# Patient Record
Sex: MALE | Race: WHITE | Employment: OTHER | URBAN - NONMETROPOLITAN AREA
[De-identification: names, ages, dates, MRNs, and addresses within clinical notes are randomized per-mention and may not be internally consistent; named-entity substitution may affect disease eponyms.]

---

## 2017-03-13 ENCOUNTER — AMBULATORY - GICH (OUTPATIENT)
Dept: UROLOGY | Facility: OTHER | Age: 82
End: 2017-03-13

## 2017-03-13 DIAGNOSIS — Z85.46 PERSONAL HISTORY OF MALIGNANT NEOPLASM OF PROSTATE: ICD-10-CM

## 2017-05-04 ENCOUNTER — OFFICE VISIT - GICH (OUTPATIENT)
Dept: UROLOGY | Facility: OTHER | Age: 82
End: 2017-05-04

## 2017-05-04 ENCOUNTER — HISTORY (OUTPATIENT)
Dept: UROLOGY | Facility: OTHER | Age: 82
End: 2017-05-04

## 2017-05-04 ENCOUNTER — AMBULATORY - GICH (OUTPATIENT)
Dept: LAB | Facility: OTHER | Age: 82
End: 2017-05-04

## 2017-05-04 DIAGNOSIS — Z85.46 PERSONAL HISTORY OF MALIGNANT NEOPLASM OF PROSTATE: ICD-10-CM

## 2017-05-04 LAB — PSA TOTAL (DIAGNOSTIC) - HISTORICAL: 1.37 NG/ML

## 2017-10-11 ENCOUNTER — COMMUNICATION - GICH (OUTPATIENT)
Dept: UROLOGY | Facility: OTHER | Age: 82
End: 2017-10-11

## 2017-10-11 DIAGNOSIS — Z85.46 PERSONAL HISTORY OF MALIGNANT NEOPLASM OF PROSTATE: ICD-10-CM

## 2017-11-15 ENCOUNTER — HISTORY (OUTPATIENT)
Dept: UROLOGY | Facility: OTHER | Age: 82
End: 2017-11-15

## 2017-11-15 ENCOUNTER — OFFICE VISIT - GICH (OUTPATIENT)
Dept: UROLOGY | Facility: OTHER | Age: 82
End: 2017-11-15

## 2017-11-15 ENCOUNTER — AMBULATORY - GICH (OUTPATIENT)
Dept: LAB | Facility: OTHER | Age: 82
End: 2017-11-15

## 2017-11-15 DIAGNOSIS — Z85.46 PERSONAL HISTORY OF MALIGNANT NEOPLASM OF PROSTATE: ICD-10-CM

## 2017-11-15 LAB — PSA TOTAL (DIAGNOSTIC) - HISTORICAL: 0.89 NG/ML

## 2017-12-28 NOTE — TELEPHONE ENCOUNTER
Patient Information     Patient Name MRN Carroll Granados [id number] Male 1935      Telephone Encounter by Sofiya Garcia at 10/11/2017  3:40 PM     Author:  Sofiya Garcia Service:  (none) Author Type:  (none)     Filed:  10/11/2017  3:41 PM Encounter Date:  10/11/2017 Status:  Signed     :  Sofiya Garcia

## 2017-12-28 NOTE — PROGRESS NOTES
Patient Information     Patient Name MRCarroll Morrow [id number] Male 1935      Progress Notes by Ibrahima Hardy MD at 11/15/2017 11:30 AM     Author:  Ibrahima Hardy MD Service:  (none) Author Type:  Physician     Filed:  11/15/2017  1:15 PM Encounter Date:  11/15/2017 Status:  Signed     :  Ibrahima Hardy MD (Physician)            PCP:  Dr Dietrich    Type of Visit  Established    Chief Complaint  History of prostate cancer, s/p EBRT    HPI  Mr. Johnson is a 82 y.o. 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  He is following up today for a PSA 6 months after the last visit.  He has no new health complaints.  He denies unintentional weight loss, bone pain or new urinary symptoms.    He continues to have back pain however this is a chronic issue.  He has had back surgery twice in the past.  He is now undergoing steroid injections with benefit.      Review of Systems  I reviewed the ROS the patient today.    Nursing Notes:   Loan Martin  11/15/2017 11:30 AM  Unsigned  Here for 6 month follow up on PSA.  Review of Systems:    Weight loss:    No     Recent fever/chills:  No   Night sweats:   No  Current skin rash:  No   Recent hair loss:  No  Heat intolerance:  No   Cold intolerance:  Yes  Chest pain:   No   Palpitations:   No  Shortness of breath:  No   Wheezing:   No  Constipation:    No   Diarrhea:   No   Nausea:   No   Vomiting:   No   Kidney/side pain:  No   Back pain:   No  Frequent headaches:  No   Dizziness:     No  Leg swelling:   Yes   Calf pain:    No    Loan Martin LPN  11/15/2017  11:30 AM              Family History  Family History       Problem   Relation Age of Onset     Diabetes  Mother      Hypertension  Mother      Stroke  Mother      Asthma  Father      Cancer-prostate  Father      Cancer-prostate  Paternal Grandfather      Cancer  Father      Stomach         Physical Exam  /64  Resp 14  Wt 89.7 kg (197  lb 12.8 oz)  BMI 28.38 kg/m2   Constitutional: NAD, WDWN.  Cardiovascular: Regular rate.  Pulmonary/Chest: Respirations are even and non-labored bilaterally.  Abdominal: Soft. No distension, tenderness, masses or guarding. No CVA tenderness.  Extremities: MICHAEL x 4, Warm. No clubbing.  No cyanosis.    Skin: Pink, warm and dry.  No rashes noted.    Labs  Results for ALEX JOHNSON (MRN [id number]) as of 11/15/2017 11:33   5/23/2016 09:05 7/18/2016 09:10 11/17/2016 09:13 5/4/2017 09:35 11/15/2017 09:30   PSA TOTAL (DIAGNOSTIC) 5.273 (H) 3.676 (H) 3.273 (H) 1.374 0.891   **completed 78 Gy EBRT 3/2016    Pathology  I personally reviewed the pathology report  11/17/2015  26 cores were obtained and placed in 14 containers  Total cores positive  +4/14 containers  Highest Norman grade: 2/14 containers, 3+4, 50%  Additional cores  2/14 containers, 3+3, 70%  Negative cores  10/14 containers    Assessment  Mr. Johnson is a 82 y.o. 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  PSA lavelle has not been defined at this point.    I explained to him that post radiation treatment failure is defined in one of 2 ways:  A. Rise of PSA of lavelle +2 or  B. 3 consecutive increases in PSA    At this point his PSA continues to decrease which is excellent.    Plan  PSA in 6 months then once annually if stable

## 2017-12-29 NOTE — PATIENT INSTRUCTIONS
Patient Information     Patient Name MRN Alex Granados [id number] Male 1935      Patient Instructions by Ibrahima Hardy MD at 11/15/2017 11:30 AM     Author:  Ibrahima Hardy MD Service:  (none) Author Type:  Physician     Filed:  11/15/2017 11:41 AM Encounter Date:  11/15/2017 Status:  Signed     :  Ibrahima Hardy MD (Physician)            Results for ALEX FUENTES (MRN [id number]) as of 11/15/2017 11:33   2016 09:05 2016 09:10 2016 09:13 2017 09:35 11/15/2017 09:30   PSA TOTAL (DIAGNOSTIC) 5.273 (H) 3.676 (H) 3.273 (H) 1.374 0.891   **completed 78 Gy EBRT 3/2016

## 2017-12-30 NOTE — NURSING NOTE
Patient Information     Patient Name MRCarroll Morrow [id number] Male 1935      Nursing Note by Loan Martin at 11/15/2017 11:30 AM     Author:  Loan Martin Service:  (none) Author Type:  (none)     Filed:  11/15/2017 11:35 AM Encounter Date:  11/15/2017 Status:  Signed     :  Loan Martin            Here for 6 month follow up on PSA.  Review of Systems:    Weight loss:    No     Recent fever/chills:  No   Night sweats:   No  Current skin rash:  No   Recent hair loss:  No  Heat intolerance:  No   Cold intolerance:  Yes  Chest pain:   No   Palpitations:   No  Shortness of breath:  No   Wheezing:   No  Constipation:    No   Diarrhea:   No   Nausea:   No   Vomiting:   No   Kidney/side pain:  No   Back pain:   No  Frequent headaches:  No   Dizziness:     No  Leg swelling:   Yes   Calf pain:    No    Loan Martin LPN  11/15/2017  11:30 AM

## 2018-01-04 NOTE — PROGRESS NOTES
Patient Information     Patient Name MRCarroll Morrow [id number] Male 1935      Progress Notes by Ibrahima Hardy MD at 2017 11:30 AM     Author:  Ibrahima Hardy MD Service:  (none) Author Type:  Physician     Filed:  2017 12:08 PM Encounter Date:  2017 Status:  Signed     :  Ibrahima Hardy MD (Physician)            PCP:  Dr Dietrich    Type of Visit  Established    Chief Complaint  History of prostate cancer, s/p EBRT    HPI  Mr. Johnson is a 82 y.o. male with intermediate risk prostate cancer s/p EBRT completed 3/2016, 1 year ago.  He underwent treatment close to his winter home in [place].  He is following up today for a PSA.  No new complaints and he is doing well.  Symptoms are all stable he continues to have no unintentional weight loss, bone pain or new urinary symptoms.  He has had recent back trouble and has had 2 back surgeries.      Review of Systems  I reviewed the ROS the patient today.    Nursing Notes:   Loan Martin  2017 11:40 AM  Signed  Here for  5 month follow up on PSA.  Review of Systems:    Weight loss:    No     Recent fever/chills:  No   Night sweats:   No  Current skin rash:  No   Recent hair loss:  No  Heat intolerance:  No   Cold intolerance:  Yes  Chest pain:   No   Palpitations:   No  Shortness of breath:  No   Wheezing:   No  Constipation:    No   Diarrhea:   No   Nausea:   No   Vomiting:   No   Kidney/side pain:  No   Back pain:   Yes  Frequent headaches:  No   Dizziness:     No  Leg swelling:   Yes   Calf pain:    No      Loan Martin LPN  2017  11:26 AM            Family History  Family History       Problem   Relation Age of Onset     Diabetes  Mother      Hypertension  Mother      Stroke  Mother      Asthma  Father      Cancer-prostate  Father      Cancer-prostate  Paternal Grandfather      Cancer  Father      Stomach         Physical Exam  /70  Resp 14  Wt 91.2 kg (201 lb)  BMI 28.84 kg/m2   Constitutional:  NAD, WDWN.  Cardiovascular: Regular rate.  Pulmonary/Chest: Respirations are even and non-labored bilaterally.  Abdominal: Soft. No distension, tenderness, masses or guarding. No CVA tenderness.  Extremities: MICHAEL x 4, Warm. No clubbing.  No cyanosis.    Skin: Pink, warm and dry.  No rashes noted.    Labs  Results for ALEX JOHNSON (MRN [id number]) as of 5/4/2017 11:40   5/23/2016 09:05 7/18/2016 09:10 11/17/2016 09:13 5/4/2017 09:35   PSA TOTAL (DIAGNOSTIC) 5.273 (H) 3.676 (H) 3.273 (H) 1.374   PSA,FREE 0.32      **completed 78 Gy EBRT 3/2016    Pathology  I personally reviewed the pathology report  11/17/2015  26 cores were obtained and placed in 14 containers  Total cores positive  +4/14 containers  Highest [place] grade: 2/14 containers, 3+4, 50%  Additional cores  2/14 containers, 3+3, 70%  Negative cores  10/14 containers    Assessment  Mr. Johnson is a 82 y.o. male with intermediate risk prostate cancer s/p EBRT completed 3/2016, 1 year ago.  PSA lavelle has not been defined at this point.    I explained to him that post radiation treatment failure is defined in one of 2 ways:  A. Rise of PSA of lavelle +2 or  B. 3 consecutive increases in PSA    At this point his PSA continues to decrease which is excellent.    Plan  PSA in 6 months, November

## 2018-01-04 NOTE — PATIENT INSTRUCTIONS
Patient Information     Patient Name MRN Alex Granados [id number] Male 1935      Patient Instructions by Ibrahima Hardy MD at 2017 11:30 AM     Author:  Ibrahima Hardy MD Service:  (none) Author Type:  Physician     Filed:  2017 11:45 AM Encounter Date:  2017 Status:  Signed     :  Ibrahima Hardy MD (Physician)            Results for ALEX FUENTES (MRN [id number]) as of 2017 11:44   2016 09:05 2016 09:10 2016 09:13 2017 09:35   PSA TOTAL (DIAGNOSTIC) 5.273 (H) 3.676 (H) 3.273 (H) 1.374

## 2018-01-04 NOTE — NURSING NOTE
Patient Information     Patient Name MRCarroll Morrow [id number] Male 1935      Nursing Note by Loan Martin at 2017 11:30 AM     Author:  Loan Martin Service:  (none) Author Type:  (none)     Filed:  2017 11:40 AM Encounter Date:  2017 Status:  Signed     :  Loan Martin            Here for  5 month follow up on PSA.  Review of Systems:    Weight loss:    No     Recent fever/chills:  No   Night sweats:   No  Current skin rash:  No   Recent hair loss:  No  Heat intolerance:  No   Cold intolerance:  Yes  Chest pain:   No   Palpitations:   No  Shortness of breath:  No   Wheezing:   No  Constipation:    No   Diarrhea:   No   Nausea:   No   Vomiting:   No   Kidney/side pain:  No   Back pain:   Yes  Frequent headaches:  No   Dizziness:     No  Leg swelling:   Yes   Calf pain:    No      Loan Martin LPN  2017  11:26 AM

## 2018-01-27 VITALS
DIASTOLIC BLOOD PRESSURE: 64 MMHG | WEIGHT: 197.8 LBS | SYSTOLIC BLOOD PRESSURE: 122 MMHG | RESPIRATION RATE: 14 BRPM | BODY MASS INDEX: 28.38 KG/M2

## 2018-01-27 VITALS
RESPIRATION RATE: 14 BRPM | WEIGHT: 201 LBS | BODY MASS INDEX: 28.84 KG/M2 | DIASTOLIC BLOOD PRESSURE: 70 MMHG | SYSTOLIC BLOOD PRESSURE: 122 MMHG

## 2018-02-28 ENCOUNTER — DOCUMENTATION ONLY (OUTPATIENT)
Dept: FAMILY MEDICINE | Facility: OTHER | Age: 83
End: 2018-02-28

## 2018-02-28 RX ORDER — LEVOTHYROXINE SODIUM 100 UG/1
100 TABLET ORAL
COMMUNITY
Start: 2017-05-04

## 2018-02-28 RX ORDER — TERAZOSIN 1 MG/1
1 CAPSULE ORAL
COMMUNITY
Start: 2017-05-04 | End: 2019-05-15

## 2018-02-28 RX ORDER — IBUPROFEN 800 MG/1
800 TABLET, FILM COATED ORAL 2 TIMES DAILY PRN
COMMUNITY
Start: 2012-11-02

## 2018-02-28 RX ORDER — ASPIRIN 81 MG/1
81 TABLET, CHEWABLE ORAL
COMMUNITY
Start: 2017-05-04

## 2018-02-28 RX ORDER — MULTIVIT WITH MINERALS/LUTEIN
1 TABLET ORAL DAILY
COMMUNITY

## 2018-03-15 DIAGNOSIS — Z85.46 HISTORY OF PROSTATE CANCER: Primary | ICD-10-CM

## 2018-04-30 ENCOUNTER — OFFICE VISIT (OUTPATIENT)
Dept: UROLOGY | Facility: OTHER | Age: 83
End: 2018-04-30
Attending: UROLOGY
Payer: MEDICARE

## 2018-04-30 VITALS — WEIGHT: 202.2 LBS | RESPIRATION RATE: 16 BRPM | HEART RATE: 82 BPM | BODY MASS INDEX: 29.01 KG/M2

## 2018-04-30 DIAGNOSIS — Z85.46 HISTORY OF PROSTATE CANCER: Primary | ICD-10-CM

## 2018-04-30 DIAGNOSIS — Z85.46 HISTORY OF PROSTATE CANCER: ICD-10-CM

## 2018-04-30 LAB — PSA SERPL-MCNC: 0.64 NG/ML

## 2018-04-30 PROCEDURE — G0463 HOSPITAL OUTPT CLINIC VISIT: HCPCS

## 2018-04-30 PROCEDURE — 84153 ASSAY OF PSA TOTAL: CPT | Performed by: UROLOGY

## 2018-04-30 PROCEDURE — 99213 OFFICE O/P EST LOW 20 MIN: CPT | Performed by: UROLOGY

## 2018-04-30 PROCEDURE — 36415 COLL VENOUS BLD VENIPUNCTURE: CPT | Performed by: UROLOGY

## 2018-04-30 NOTE — MR AVS SNAPSHOT
"              After Visit Summary   2018    Carroll Johnson    MRN: [id number]           Patient Information     Date Of Birth          1935        Visit Information        Provider Department      2018 11:30 AM Ibrahima Hardy MD [facility]         Follow-ups after your visit        Who to contact     If you have questions or need follow up information about today's clinic visit or your schedule please contact [facility] directly at [phone].  Normal or non-critical lab and imaging results will be communicated to you by MyChart, letter or phone within 4 business days after the clinic has received the results. If you do not hear from us within 7 days, please contact the clinic through [employer] or phone. If you have a critical or abnormal lab result, we will notify you by phone as soon as possible.  Submit refill requests through [email or web address] or call your pharmacy and they will forward the refill request to us. Please allow 3 business days for your refill to be completed.          Additional Information About Your Visit        [employer] Information     [email or web address] lets you send messages to your doctor, view your test results, renew your prescriptions, schedule appointments and more. To sign up, go to [email or web address] . Click on \"Log in\" on the left side of the screen, which will take you to the Welcome page. Then click on \"Sign up Now\" on the right side of the page.     You will be asked to enter the access code listed below, as well as some personal information. Please follow the directions to create your username and password.     Your access code is: [email or web address]  Expires: 2018  1:18 PM     Your access code will  in 90 days. If you need help or a new code, please call your [facility] clinic or [phone].        Care EveryWhere ID     This is your Care EveryWhere ID. This could be used by other organizations to access your [facility] medical " records  [id number]        Your Vitals Were     Pulse Respirations BMI (Body Mass Index)             82 16 29.01 kg/m2          Blood Pressure from Last 3 Encounters:   11/15/17 122/64   05/04/17 122/70   11/17/16 126/76    Weight from Last 3 Encounters:   04/30/18 91.7 kg (202 lb 3.2 oz)   11/15/17 89.7 kg (197 lb 12.8 oz)   05/04/17 91.2 kg (201 lb)              Today, you had the following     No orders found for display       Primary Care Provider Office Phone # Fax #    Kip Dietrich [phone]       [facility] [address] [place] [address]        Equal Access to Services     [facility] Center[facility] : Hadii josé miguel deweyo Soomaali, waaxda luqadaha, qaybta kaalmada adeegyada, [facility] [place] . So [facility] [phone].    ATENCIÓN: Si habla español, tiene a nunez disposición servicios gratuitos de asistencia lingüística. [facility] [phone].    We comply with applicable federal civil rights laws and [place] laws. We do not discriminate on the basis of race, color, national origin, age, disability, sex, sexual orientation, or gender identity.            Thank you!     Thank you for choosing [facility]  for your care. Our goal is always to provide you with excellent care. Hearing back from our patients is one way we can continue to improve our services. Please take a few minutes to complete the written survey that you may receive in the mail after your visit with us. Thank you!             Your Updated Medication List - Protect others around you: Learn how to safely use, store and throw away your medicines at www.disposemymeds.org.          This list is accurate as of 4/30/18  1:18 PM.  Always use your most recent med list.                   Brand Name Dispense Instructions for use Diagnosis    amitriptyline 25 MG tablet    ELAVIL     Take 25 mg by mouth as needed        ascorbic acid 1000 MG Tabs    vitamin C     Take 1 tablet by mouth daily         aspirin 81 MG chewable tablet      Take 81 mg by mouth daily with food        ibuprofen 800 MG tablet    ADVIL/MOTRIN     Take 800 mg by mouth 2 times daily as needed With meals.        levothyroxine 100 MCG tablet    SYNTHROID/LEVOTHROID     Take 100 mcg by mouth every morning (before breakfast)         CALCIUM 500/VITAMIN D3 500-400 MG-UNIT Tabs per tablet   Generic drug:  calcium carbonate-vitamin D      Take 1 tablet by mouth daily         VITAMIN B-12 500 MCG Tabs   Generic drug:  cyanocobalamin      Take 1 tablet by mouth daily        terazosin 1 MG capsule    HYTRIN     Take 1 mg by mouth Take 1 capsule by mouth at bedtime. Take 2 capsules by mouth at bedtime

## 2018-04-30 NOTE — PROGRESS NOTES
PCP:  Dr Dietrich    Type of Visit  Established    Chief Complaint  History of prostate cancer, s/p EBRT    HPI  Mr. Johnson is a 83 y.o. male with intermediate risk prostate cancer s/p EBRT completed 3/2016, 2 years ago.  He is following up today for a PSA 6 months after the last visit.  He has no new health complaints.  He continues to deny unintentional weight loss no urinary symptoms or pelvic pain.  He has minimal bowel symptoms.  He takes 2 mg daily of terazosin for his urinary symptoms.    He continues to have back pain however this is a chronic issue.  He has had back surgery twice in the past.  He continues undergoing steroid injections with diminishing benefit.      Review of Systems  I reviewed the ROS the patient today.    Nursing Notes:   Sofiya Garcia LPN  4/30/2018 11:46 AM  Addendum  Pt presents to clinic for six month follow up, history of prostate cancer    Review of Systems:    Weight loss:    No     Recent fever/chills:  No   Night sweats:   No  Current skin rash:  No   Recent hair loss:  No  Heat intolerance:  No   Cold intolerance:  Yes  Chest pain:   No   Palpitations:   No  Shortness of breath:  No   Wheezing:   No  Constipation:    No   Diarrhea:   No   Nausea:   No   Vomiting:   No   Kidney/side pain:  No   Back pain:   Yes  Frequent headaches:  No   Dizziness:     No  Leg swelling:   Yes   Calf pain:    No      Physical Exam  Pulse 82  Resp 16  Wt 91.7 kg (202 lb 3.2 oz)  BMI 29.01 kg/m2  Constitutional: NAD, WDWN.  Cardiovascular: Regular rate.  Pulmonary/Chest: Respirations are even and non-labored bilaterally.  Abdominal: Soft. No distension, tenderness, masses or guarding. No CVA tenderness.  Extremities: MICHAEL x 4, Warm. No clubbing.  No cyanosis.    Skin: Pink, warm and dry.  No rashes noted.    Labs  Results for ALEX JOHNSON (MRN [id number]) as of 4/30/2018 11:56   4/30/2018 09:40   Prostate Specific Antigen 0.636     Results for ALEX JOHNSON (MRN  [id number]) as of 11/15/2017 11:33   5/23/2016 09:05 7/18/2016 09:10 11/17/2016 09:13 5/4/2017 09:35 11/15/2017 09:30   PSA TOTAL (DIAGNOSTIC) 5.273 (H) 3.676 (H) 3.273 (H) 1.374 0.891   **completed 78 Gy EBRT 3/2016    Pathology  I personally reviewed the pathology report  11/17/2015  26 cores were obtained and placed in 14 containers  Total cores positive  +4/14 containers  Highest [place] grade: 2/14 containers, 3+4, 50%  Additional cores  2/14 containers, 3+3, 70%  Negative cores  10/14 containers    Assessment  Mr. Johnson is a 83 y.o. male with intermediate risk prostate cancer s/p EBRT completed 3/2016, 2 years ago.  PSA continues downward trend  Discussed decreasing interval of PSAs to once annually.    Plan  Terazosin 2mg daily  PSA once annually

## 2018-04-30 NOTE — NURSING NOTE
Pt presents to clinic for six month follow up, history of prostate cancer    Review of Systems:    Weight loss:    No     Recent fever/chills:  No   Night sweats:   No  Current skin rash:  No   Recent hair loss:  No  Heat intolerance:  No   Cold intolerance:  Yes  Chest pain:   No   Palpitations:   No  Shortness of breath:  No   Wheezing:   No  Constipation:    No   Diarrhea:   No   Nausea:   No   Vomiting:   No   Kidney/side pain:  No   Back pain:   Yes  Frequent headaches:  No   Dizziness:     No  Leg swelling:   Yes   Calf pain:    No

## 2018-07-23 NOTE — PROGRESS NOTES
Patient Information     Patient Name  Carroll Johnson MRN  [id number] Sex  Male   1935      Letter by Ibrahima Hardy MD at      Author:  Ibrahima Hardy MD Service:  (none) Author Type:  (none)    Filed:   Encounter Date:  11/15/2017 Status:  (Other)           Kip Dietrich MD  [address]   [address]  [place] [address]          November 15, 2017    Re:  Carroll Johnson       : 1935  [address] [address] [facility]  [place] [address]    Appointment Date: 11/15/17      Dear  Dr Dietrich,    Thank you for allowing me to take part in this patient s care.  I copied my note below from today's clinic visit for your records.  Please feel free to contact me with any questions      Warm regards,            Ibrahima Hardy MD, PharmD, FACS  Urologist  [address] [place] [address]  Appointments: [phone]                    PCP:  Dr Dietrich    Type of Visit  Established    Chief Complaint  History of prostate cancer, s/p EBRT    HPI  Mr. Johnson is a 82 y.o. 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  He is following up today for a PSA 6 months after the last visit.  He has no new health complaints.  He denies unintentional weight loss, bone pain or new urinary symptoms.    He continues to have back pain however this is a chronic issue.  He has had back surgery twice in the past.  He is now undergoing steroid injections with benefit.      Review of Systems  I reviewed the ROS the patient today.    Nursing Notes:   Loan Martin  11/15/2017 11:30 AM  Unsigned  Here for 6 month follow up on PSA.  Review of Systems:    Weight loss:    No     Recent fever/chills:  No   Night sweats:   No  Current skin rash:  No   Recent hair loss:  No  Heat intolerance:  No   Cold intolerance:  Yes  Chest pain:   No   Palpitations:   No  Shortness of breath:  No   Wheezing:   No  Constipation:     No   Diarrhea:   No   Nausea:   No   Vomiting:   No   Kidney/side pain:  No   Back pain:   No  Frequent headaches:  No   Dizziness:     No  Leg swelling:   Yes   Calf pain:    No    Loan Martin LPN  11/15/2017  11:30 AM              Family History  Family History       Problem   Relation Age of Onset     Diabetes  Mother      Hypertension  Mother      Stroke  Mother      Asthma  Father      Cancer-prostate  Father      Cancer-prostate  Paternal Grandfather      Cancer  Father      Stomach         Physical Exam  /64  Resp 14  Wt 89.7 kg (197 lb 12.8 oz)  BMI 28.38 kg/m2   Constitutional: NAD, WDWN.  Cardiovascular: Regular rate.  Pulmonary/Chest: Respirations are even and non-labored bilaterally.  Abdominal: Soft. No distension, tenderness, masses or guarding. No CVA tenderness.  Extremities: MICHAEL x 4, Warm. No clubbing.  No cyanosis.    Skin: Pink, warm and dry.  No rashes noted.    Labs  Results for ALEX FUENTES (MRN [id number]) as of 11/15/2017 11:33   5/23/2016 09:05 7/18/2016 09:10 11/17/2016 09:13 5/4/2017 09:35 11/15/2017 09:30   PSA TOTAL (DIAGNOSTIC) 5.273 (H) 3.676 (H) 3.273 (H) 1.374 0.891   **completed 78 Gy EBRT 3/2016    Pathology  I personally reviewed the pathology report  11/17/2015  26 cores were obtained and placed in 14 containers  Total cores positive  +4/14 containers  Highest [place] grade: 2/14 containers, 3+4, 50%  Additional cores  2/14 containers, 3+3, 70%  Negative cores  10/14 containers    Assessment  Mr. Fuentes is a 82 y.o. 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  PSA lavelle has not been defined at this point.    I explained to him that post radiation treatment failure is defined in one of 2 ways:  A. Rise of PSA of lavelle +2 or  B. 3 consecutive increases in PSA    At this point his PSA continues to decrease which is excellent.    Plan  PSA in 6 months then once annually if stable

## 2018-08-23 ENCOUNTER — HOSPITAL ENCOUNTER (OUTPATIENT)
Dept: GENERAL RADIOLOGY | Facility: OTHER | Age: 83
Discharge: HOME | End: 2018-08-23
Attending: ORTHOPAEDIC SURGERY | Admitting: ORTHOPAEDIC SURGERY
Payer: MEDICARE

## 2018-08-23 DIAGNOSIS — M48.062 SPINAL STENOSIS, LUMBAR REGION, WITH NEUROGENIC CLAUDICATION: ICD-10-CM

## 2018-08-23 DIAGNOSIS — M54.50 LOW BACK PAIN: ICD-10-CM

## 2018-08-23 PROCEDURE — 25000128 H RX IP 250 OP 636: Performed by: RADIOLOGY

## 2018-08-23 PROCEDURE — 25000125 ZZHC RX 250: Performed by: RADIOLOGY

## 2018-08-23 PROCEDURE — 62323 NJX INTERLAMINAR LMBR/SAC: CPT

## 2018-08-23 PROCEDURE — 25500064 ZZH RX 255 OP 636: Performed by: RADIOLOGY

## 2018-08-23 RX ORDER — BETAMETHASONE SODIUM PHOSPHATE AND BETAMETHASONE ACETATE 3; 3 MG/ML; MG/ML
12 INJECTION, SUSPENSION INTRA-ARTICULAR; INTRALESIONAL; INTRAMUSCULAR; SOFT TISSUE ONCE
Status: COMPLETED | OUTPATIENT
Start: 2018-08-23 | End: 2018-08-23

## 2018-08-23 RX ORDER — LIDOCAINE HYDROCHLORIDE 10 MG/ML
2 INJECTION, SOLUTION EPIDURAL; INFILTRATION; INTRACAUDAL; PERINEURAL ONCE
Status: COMPLETED | OUTPATIENT
Start: 2018-08-23 | End: 2018-08-23

## 2018-08-23 RX ADMIN — LIDOCAINE HYDROCHLORIDE 2 ML: 10 INJECTION, SOLUTION EPIDURAL; INFILTRATION; INTRACAUDAL; PERINEURAL at 09:47

## 2018-08-23 RX ADMIN — BETAMETHASONE SODIUM PHOSPHATE AND BETAMETHASONE ACETATE 12 MG: 3; 3 INJECTION, SUSPENSION INTRA-ARTICULAR; INTRALESIONAL; INTRAMUSCULAR at 09:46

## 2018-08-23 RX ADMIN — IOHEXOL 4 ML: 240 INJECTION, SOLUTION INTRATHECAL; INTRAVASCULAR; INTRAVENOUS; ORAL at 09:47

## 2018-08-23 RX ADMIN — LIDOCAINE HYDROCHLORIDE 4 ML: 10 INJECTION, SOLUTION INFILTRATION; PERINEURAL at 09:47

## 2018-10-23 ENCOUNTER — HOSPITAL ENCOUNTER (OUTPATIENT)
Dept: GENERAL RADIOLOGY | Facility: OTHER | Age: 83
Discharge: HOME | End: 2018-10-23
Attending: ORTHOPAEDIC SURGERY | Admitting: FAMILY MEDICINE
Payer: MEDICARE

## 2018-10-23 DIAGNOSIS — M48.062 SPINAL STENOSIS, LUMBAR REGION, WITH NEUROGENIC CLAUDICATION: ICD-10-CM

## 2018-10-23 DIAGNOSIS — M54.9 BACK PAIN: ICD-10-CM

## 2018-10-23 DIAGNOSIS — M79.606 LEG PAIN: ICD-10-CM

## 2018-10-23 PROCEDURE — 25000128 H RX IP 250 OP 636: Mod: GZ | Performed by: RADIOLOGY

## 2018-10-23 PROCEDURE — 25000125 ZZHC RX 250: Performed by: RADIOLOGY

## 2018-10-23 PROCEDURE — 27211110 XR SACROILIAC THERAPEUTIC INJECTION BILATERAL

## 2018-10-23 PROCEDURE — 25500064 ZZH RX 255 OP 636: Performed by: RADIOLOGY

## 2018-10-23 RX ORDER — BUPIVACAINE HYDROCHLORIDE 5 MG/ML
2 INJECTION, SOLUTION PERINEURAL ONCE
Status: COMPLETED | OUTPATIENT
Start: 2018-10-23 | End: 2018-10-23

## 2018-10-23 RX ORDER — TRIAMCINOLONE ACETONIDE 40 MG/ML
80 INJECTION, SUSPENSION INTRA-ARTICULAR; INTRAMUSCULAR ONCE
Status: COMPLETED | OUTPATIENT
Start: 2018-10-23 | End: 2018-10-23

## 2018-10-23 RX ADMIN — TRIAMCINOLONE ACETONIDE 80 MG: 40 INJECTION, SUSPENSION INTRA-ARTICULAR; INTRAMUSCULAR at 12:50

## 2018-10-23 RX ADMIN — IOHEXOL 2 ML: 240 INJECTION, SOLUTION INTRATHECAL; INTRAVASCULAR; INTRAVENOUS; ORAL at 12:50

## 2018-10-23 RX ADMIN — BUPIVACAINE HYDROCHLORIDE 20 MG: 5 INJECTION, SOLUTION PERINEURAL at 12:50

## 2018-10-23 RX ADMIN — LIDOCAINE HYDROCHLORIDE 2 ML: 10 INJECTION, SOLUTION INFILTRATION; PERINEURAL at 12:50

## 2019-01-08 ENCOUNTER — DOCUMENTATION ONLY (OUTPATIENT)
Dept: OTHER | Facility: CLINIC | Age: 84
End: 2019-01-08

## 2019-01-24 DIAGNOSIS — Z85.46 HISTORY OF PROSTATE CANCER: Primary | ICD-10-CM

## 2019-05-15 ENCOUNTER — OFFICE VISIT (OUTPATIENT)
Dept: UROLOGY | Facility: OTHER | Age: 84
End: 2019-05-15
Attending: UROLOGY
Payer: MEDICARE

## 2019-05-15 VITALS — BODY MASS INDEX: 28.96 KG/M2 | WEIGHT: 201.8 LBS | RESPIRATION RATE: 16 BRPM | HEART RATE: 76 BPM

## 2019-05-15 DIAGNOSIS — Z85.46 HISTORY OF PROSTATE CANCER: ICD-10-CM

## 2019-05-15 DIAGNOSIS — Z85.46 HISTORY OF PROSTATE CANCER: Primary | ICD-10-CM

## 2019-05-15 LAB — PSA SERPL-MCNC: 0.34 NG/ML

## 2019-05-15 PROCEDURE — 99213 OFFICE O/P EST LOW 20 MIN: CPT | Performed by: UROLOGY

## 2019-05-15 PROCEDURE — 84153 ASSAY OF PSA TOTAL: CPT | Performed by: UROLOGY

## 2019-05-15 PROCEDURE — 36415 COLL VENOUS BLD VENIPUNCTURE: CPT | Performed by: UROLOGY

## 2019-05-15 PROCEDURE — G0463 HOSPITAL OUTPT CLINIC VISIT: HCPCS

## 2019-05-15 RX ORDER — TERAZOSIN 1 MG/1
2 CAPSULE ORAL AT BEDTIME
COMMUNITY
Start: 2019-05-15 | End: 2020-06-01

## 2019-05-15 ASSESSMENT — PAIN SCALES - GENERAL: PAINLEVEL: NO PAIN (0)

## 2019-05-15 NOTE — NURSING NOTE
Review of Systems:    Weight loss:    No     Recent fever/chills:  No   Night sweats:   No  Current skin rash:  No   Recent hair loss:  No  Heat intolerance:  No   Cold intolerance:  No  Chest pain:   No   Palpitations:   No  Shortness of breath:  No   Wheezing:   No  Constipation:    No   Diarrhea:   No   Nausea:   No   Vomiting:   No   Kidney/side pain:  No   Back pain:   Yes  Frequent headaches:  No   Dizziness:     No  Leg swelling:   Yes   Calf pain:    No

## 2019-05-15 NOTE — PROGRESS NOTES
PCP:  Dr Dietrich    Type of Visit  Established    Chief Complaint  History of prostate cancer, s/p EBRT    HPI  Mr. Johnson is a 84 y.o. 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  He was last seen 1 year ago and follows up with annual PSA checks.  Last year he denies any significant healthcare changes.  He denies irritative urinary and bowel symptoms.  He continues taking Solitario and 2 mg daily without issue.    He continues to have back pain however this is a chronic issue.  He has had back surgery twice in the past.  He continues undergoing steroid injections with diminishing benefit.  He recently saw a spine specialist who was told that his back issues are essentially nonoperable.      Review of Systems  I reviewed the ROS the patient today.    Nursing Notes:   Rosina Gandhi RN  5/15/2019  2:02 PM  Signed  Review of Systems:    Weight loss:    No     Recent fever/chills:  No   Night sweats:   No  Current skin rash:  No   Recent hair loss:  No  Heat intolerance:  No   Cold intolerance:  No  Chest pain:   No   Palpitations:   No  Shortness of breath:  No   Wheezing:   No  Constipation:    No   Diarrhea:   No   Nausea:   No   Vomiting:   No   Kidney/side pain:  No   Back pain:   Yes  Frequent headaches:  No   Dizziness:     No  Leg swelling:   Yes   Calf pain:    No     Physical Exam  Pulse 76   Resp 16   Wt 91.5 kg (201 lb 12.8 oz)   BMI 28.96 kg/m    Constitutional: NAD, WDWN.  Cardiovascular: Regular rate.  Pulmonary/Chest: Respirations are even and non-labored bilaterally.  Abdominal: Soft. No distension, tenderness, masses or guarding. No CVA tenderness.  Extremities: MICHAEL x 4, Warm. No clubbing.  No cyanosis.    Skin: Pink, warm and dry.  No rashes noted.    LabsResults for ALEX JOHNSON (MRN [id number]) as of 5/15/2019 13:39   11/15/2017 10:17 4/30/2018 09:40 5/15/2019 09:59   Prostate Specific Antigen  0.636 0.340   PSA Total 0.891       Results  for ALEX JOHNSON (MRN [id number]) as of 11/15/2017 11:33   5/23/2016 09:05 7/18/2016 09:10 11/17/2016 09:13 5/4/2017 09:35   PSA TOTAL (DIAGNOSTIC) 5.273 (H) 3.676 (H) 3.273 (H) 1.374   **completed 78 Gy EBRT 3/2016    Pathology  I personally reviewed the pathology report  11/17/2015  26 cores were obtained and placed in 14 containers  Total cores positive  +4/14 containers  Highest [place] grade: 2/14 containers, 3+4, 50%  Additional cores  2/14 containers, 3+3, 70%  Negative cores  10/14 containers    Assessment  Mr. Johnson is a 84 y.o. male with intermediate risk prostate cancer s/p EBRT completed 3/2016.  His PSA continues a downward trend.    Plan  Terazosin 2mg daily  Follow-up for PSA once annually

## 2020-03-24 DIAGNOSIS — Z85.46 HISTORY OF PROSTATE CANCER: Primary | ICD-10-CM

## 2020-06-01 ENCOUNTER — OFFICE VISIT (OUTPATIENT)
Dept: UROLOGY | Facility: OTHER | Age: 85
End: 2020-06-01
Attending: UROLOGY
Payer: MEDICARE

## 2020-06-01 VITALS
RESPIRATION RATE: 16 BRPM | WEIGHT: 181 LBS | SYSTOLIC BLOOD PRESSURE: 112 MMHG | DIASTOLIC BLOOD PRESSURE: 76 MMHG | HEART RATE: 78 BPM | BODY MASS INDEX: 25.97 KG/M2

## 2020-06-01 DIAGNOSIS — Z85.46 HISTORY OF PROSTATE CANCER: ICD-10-CM

## 2020-06-01 DIAGNOSIS — R35.0 BENIGN PROSTATIC HYPERPLASIA WITH URINARY FREQUENCY: Primary | ICD-10-CM

## 2020-06-01 DIAGNOSIS — N40.1 BENIGN PROSTATIC HYPERPLASIA WITH URINARY FREQUENCY: Primary | ICD-10-CM

## 2020-06-01 LAB — PSA SERPL-MCNC: 0.26 NG/ML

## 2020-06-01 PROCEDURE — 84153 ASSAY OF PSA TOTAL: CPT | Mod: ZL | Performed by: UROLOGY

## 2020-06-01 PROCEDURE — 99213 OFFICE O/P EST LOW 20 MIN: CPT | Performed by: UROLOGY

## 2020-06-01 PROCEDURE — G0463 HOSPITAL OUTPT CLINIC VISIT: HCPCS

## 2020-06-01 PROCEDURE — 36415 COLL VENOUS BLD VENIPUNCTURE: CPT | Mod: ZL | Performed by: UROLOGY

## 2020-06-01 RX ORDER — TAMSULOSIN HYDROCHLORIDE 0.4 MG/1
0.4 CAPSULE ORAL EVERY EVENING
Qty: 90 CAPSULE | Refills: 3 | Status: SHIPPED | OUTPATIENT
Start: 2020-06-01 | End: 2021-07-21

## 2020-06-01 RX ORDER — PAROXETINE 10 MG/1
20 TABLET, FILM COATED ORAL EVERY MORNING
COMMUNITY

## 2020-06-01 RX ORDER — DOXAZOSIN 1 MG/1
1 TABLET ORAL AT BEDTIME
COMMUNITY
End: 2020-06-01

## 2020-06-01 ASSESSMENT — PAIN SCALES - GENERAL: PAINLEVEL: MILD PAIN (2)

## 2020-06-01 NOTE — NURSING NOTE
Pt presents to clinic for a one year follow up    Review of Systems:    Weight loss:    Yes     Recent fever/chills:  No   Night sweats:   No  Current skin rash:  No   Recent hair loss:  No  Heat intolerance:  No   Cold intolerance:  Yes  Chest pain:   No   Palpitations:   No  Shortness of breath:  No   Wheezing:   No  Constipation:    No   Diarrhea:   No   Nausea:   No   Vomiting:   No   Kidney/side pain:  No   Back pain:   Yes  Frequent headaches:  No   Dizziness:     No  Leg swelling:   Yes   Calf pain:    No

## 2020-06-01 NOTE — PROGRESS NOTES
PCP:  Dr Dietrich    Type of Visit  Established    Chief Complaint  History of prostate cancer, s/p EBRT  BPH with weak stream    HPI  Mr. Johnson is a 85 y.o. male with intermediate risk prostate cancer s/p EBRT completed in 2016.  The patient follows up once a year with PSA prior.  He also successfully takes terazosin 2mg for urinary issues.  He denies any health changes in the last year.  He denies unintentional weight loss, bone or pelvic pain.    He continues to have back pain however this is a chronic issue.  He has had back surgery twice in the past.  He continues undergoing steroid injections with diminishing benefit.  He previously saw a spine specialist who was told that his back issues are essentially nonoperable.    The patient had been on long-term alpha blockade for BPH benefit but has recently developed some lightheadedness.  He has been on both terazosin and doxazosin.  He is asking about alternative to my side effects of lightheadedness.      Review of Systems  I reviewed the ROS the patient today.    Nursing Notes:   Sofiya Garcia LPN  6/1/2020  2:07 PM  Signed  Pt presents to clinic for a one year follow up    Review of Systems:    Weight loss:    Yes     Recent fever/chills:  No   Night sweats:   No  Current skin rash:  No   Recent hair loss:  No  Heat intolerance:  No   Cold intolerance:  Yes  Chest pain:   No   Palpitations:   No  Shortness of breath:  No   Wheezing:   No  Constipation:    No   Diarrhea:   No   Nausea:   No   Vomiting:   No   Kidney/side pain:  No   Back pain:   Yes  Frequent headaches:  No   Dizziness:     No  Leg swelling:   Yes   Calf pain:    No      Physical Exam  /76 (BP Location: Left arm, Patient Position: Sitting, Cuff Size: Adult Regular)   Pulse 78   Resp 16   Wt 82.1 kg (181 lb)   BMI 25.97 kg/m    Constitutional: NAD, WDWN.  Cardiovascular: Regular rate.  Pulmonary/Chest: Respirations are even and non-labored bilaterally.  Abdominal: Soft. No  distension, tenderness, masses or guarding. No CVA tenderness.  Extremities: MICHAEL x 4, Warm. No clubbing.  No cyanosis.    Skin: Pink, warm and dry.  No rashes noted.    Labs  Results for ALEX JOHNSON (MRN [id number]) as of 6/1/2020 13:55   6/1/2020 10:49   Prostate Specific Antigen 0.256     Results for ALEX JOHNSON (MRN [id number]) as of 5/15/2019 13:39   11/15/2017 10:17 4/30/2018 09:40 5/15/2019 09:59   Prostate Specific Antigen 0.891 0.636 0.340     Results for ALEX JOHNSON (MRN [id number]) as of 11/15/2017 11:33   5/23/2016 09:05 7/18/2016 09:10 11/17/2016 09:13 5/4/2017 09:35   PSA TOTAL (DIAGNOSTIC) 5.273 (H) 3.676 (H) 3.273 (H) 1.374   **completed 78 Gy EBRT 3/2016    Pathology  I personally reviewed the pathology report  11/17/2015  26 cores were obtained and placed in 14 containers  Total cores positive  +4/14 containers  Highest [place] grade: 2/14 containers, 3+4, 50%  Additional cores  2/14 containers, 3+3, 70%  Negative cores  10/14 containers    Assessment  Mr. Johnson is a 85 y.o. male with intermediate risk prostate cancer s/p EBRT completed in 2016.  His PSA is completely stable.    We discussed side effects of Flomax including, but not limited to, retrograde ejaculation, congestion and lightheadedness.    Plan  Discontinue doxazosin  I recommended the patient start Flomax 0.4 mg once daily  Follow-up for PSA once annually

## 2021-02-03 DIAGNOSIS — Z85.46 HISTORY OF PROSTATE CANCER: Primary | ICD-10-CM

## 2021-02-03 NOTE — PROGRESS NOTES
"Per last office visit  6/1/20 with Ibrahima Hardy MD \"Plan  Discontinue doxazosin  I recommended the patient start Flomax 0.4 mg once daily  Follow-up for PSA once annually\"        Orders Placed    "

## 2021-05-26 ENCOUNTER — RECORDS - HEALTHEAST (OUTPATIENT)
Dept: ADMINISTRATIVE | Facility: CLINIC | Age: 86
End: 2021-05-26

## 2021-06-22 DIAGNOSIS — N40.1 BENIGN PROSTATIC HYPERPLASIA WITH URINARY FREQUENCY: ICD-10-CM

## 2021-06-22 DIAGNOSIS — R35.0 BENIGN PROSTATIC HYPERPLASIA WITH URINARY FREQUENCY: ICD-10-CM

## 2021-07-01 RX ORDER — TAMSULOSIN HYDROCHLORIDE 0.4 MG/1
CAPSULE ORAL
Qty: 90 CAPSULE | Refills: 0 | OUTPATIENT
Start: 2021-07-01

## 2021-07-21 ENCOUNTER — OFFICE VISIT (OUTPATIENT)
Dept: UROLOGY | Facility: OTHER | Age: 86
End: 2021-07-21
Attending: UROLOGY
Payer: MEDICARE

## 2021-07-21 ENCOUNTER — LAB (OUTPATIENT)
Dept: LAB | Facility: OTHER | Age: 86
End: 2021-07-21
Attending: UROLOGY
Payer: MEDICARE

## 2021-07-21 VITALS
SYSTOLIC BLOOD PRESSURE: 119 MMHG | WEIGHT: 186 LBS | RESPIRATION RATE: 16 BRPM | DIASTOLIC BLOOD PRESSURE: 72 MMHG | OXYGEN SATURATION: 96 % | BODY MASS INDEX: 26.69 KG/M2 | HEART RATE: 94 BPM

## 2021-07-21 DIAGNOSIS — R35.0 BENIGN PROSTATIC HYPERPLASIA WITH URINARY FREQUENCY: ICD-10-CM

## 2021-07-21 DIAGNOSIS — Z85.46 HISTORY OF PROSTATE CANCER: ICD-10-CM

## 2021-07-21 DIAGNOSIS — N40.1 BENIGN PROSTATIC HYPERPLASIA WITH URINARY FREQUENCY: ICD-10-CM

## 2021-07-21 DIAGNOSIS — Z85.46 HISTORY OF PROSTATE CANCER: Primary | ICD-10-CM

## 2021-07-21 LAB — PSA SERPL-MCNC: 0.2 UG/L (ref 0–4)

## 2021-07-21 PROCEDURE — 36415 COLL VENOUS BLD VENIPUNCTURE: CPT | Mod: ZL

## 2021-07-21 PROCEDURE — G0463 HOSPITAL OUTPT CLINIC VISIT: HCPCS

## 2021-07-21 PROCEDURE — 99213 OFFICE O/P EST LOW 20 MIN: CPT | Performed by: UROLOGY

## 2021-07-21 PROCEDURE — 84153 ASSAY OF PSA TOTAL: CPT | Mod: ZL

## 2021-07-21 RX ORDER — TAMSULOSIN HYDROCHLORIDE 0.4 MG/1
0.4 CAPSULE ORAL EVERY EVENING
Qty: 90 CAPSULE | Refills: 3 | Status: SHIPPED | OUTPATIENT
Start: 2021-07-21

## 2021-07-21 ASSESSMENT — PAIN SCALES - GENERAL: PAINLEVEL: NO PAIN (1)

## 2021-07-21 NOTE — NURSING NOTE
Chief Complaint   Patient presents with     Follow Up     Medication refill     Patient presents to the clinic today for a follow up for medication refill.    Review of Systems:    Weight loss:    No     Recent fever/chills:  No   Night sweats:   No  Current skin rash:  No   Recent hair loss:  No  Heat intolerance:  No   Cold intolerance:  No  Chest pain:   No   Palpitations:   No  Shortness of breath:  No   Wheezing:   No  Constipation:    No   Diarrhea:   No   Nausea:   No   Vomiting:   No   Kidney/side pain:  No   Back pain:   Yes  Frequent headaches:  No   Dizziness:     No  Leg swelling:   Yes   Calf pain:    No         Medication Reconciliation: completed   Taylor Navas LPN  7/21/2021 1:31 PM

## 2021-07-21 NOTE — PROGRESS NOTES
PCP:  Dr Dietrich    Type of Visit  Established    Chief Complaint  History of prostate cancer, s/p EBRT  BPH with weak stream    HPI  Mr. Johnson is a 86 y.o. male with intermediate risk prostate cancer s/p EBRT completed in 2016.    Patient follows up for medication refill.  He also underwent a PSA earlier.  He continues to struggle with chronic back pain.  No new symptoms however such as gross hematuria, dysuria or pelvic pain.    The patient takes Flomax daily for BPH.  He was previously on terazosin and doxazosin at separate times however lightheadedness prompted the change to Flomax.  No lightheadedness with Flomax.      Review of Systems  I reviewed the ROS the patient today.    Nursing Notes:   Taylor Navas LPN  7/21/2021  1:36 PM  Addendum  Chief Complaint   Patient presents with     Follow Up     Medication refill     Patient presents to the clinic today for a follow up for medication refill.    Review of Systems:    Weight loss:    No     Recent fever/chills:  No   Night sweats:   No  Current skin rash:  No   Recent hair loss:  No  Heat intolerance:  No   Cold intolerance:  No  Chest pain:   No   Palpitations:   No  Shortness of breath:  No   Wheezing:   No  Constipation:    No   Diarrhea:   No   Nausea:   No   Vomiting:   No   Kidney/side pain:  No   Back pain:   Yes  Frequent headaches:  No   Dizziness:     No  Leg swelling:   Yes   Calf pain:    No         Medication Reconciliation: completed   Taylor Navas LPN  7/21/2021 1:31 PM     Physical Exam  /72 (BP Location: Right arm, Patient Position: Sitting, Cuff Size: Adult Regular)   Pulse 94   Resp 16   Wt 84.4 kg (186 lb)   SpO2 96%   BMI 26.69 kg/m    Constitutional: NAD, WDWN.  Cardiovascular: Regular rate.  Pulmonary/Chest: Respirations are even and non-labored bilaterally.  Abdominal: Soft. No distension, tenderness, masses or guarding. No CVA tenderness.  Extremities: MICHAEL x 4, Warm. No clubbing.  No cyanosis.    Skin: Pink, warm  and dry.  No rashes noted.    Labs  Results for ALEX JOHNSON (MRN [id number]) as of 7/21/2021 13:43   5/15/2019 09:59 6/1/2020 10:49 7/21/2021 11:13   PSA 0.340 0.256 0.20     Results for ALEX JOHNSON (MRN [id number]) as of 5/15/2019 13:39   11/15/2017 10:17 4/30/2018 09:40 5/15/2019 09:59   Prostate Specific Antigen 0.891 0.636 0.340     Results for ALEX JOHNSON (MRN [id number]) as of 11/15/2017 11:33   5/23/2016 09:05 7/18/2016 09:10 11/17/2016 09:13 5/4/2017 09:35   PSA TOTAL (DIAGNOSTIC) 5.273 (H) 3.676 (H) 3.273 (H) 1.374   **completed 78 Gy EBRT 3/2016    Pathology  I personally reviewed the pathology report  11/17/2015  26 cores were obtained and placed in 14 containers  Total cores positive  +4/14 containers  Highest [place] grade: 2/14 containers, 3+4, 50%  Additional cores  2/14 containers, 3+3, 70%  Negative cores  10/14 containers    Assessment  Mr. Johnson is a 86 y.o. male with intermediate risk prostate cancer s/p EBRT completed in 2016.  PSA is stable  His PSA is completely stable.  We discussed that he could obtain future refills through his PCP and see me as needed if desired.    Plan  Continue Flomax 0.4 mg once daily  Follow-up as needed or in 1 year if refills are requested

## (undated) RX ORDER — LIDOCAINE HYDROCHLORIDE 10 MG/ML
INJECTION, SOLUTION EPIDURAL; INFILTRATION; INTRACAUDAL; PERINEURAL
Status: DISPENSED
Start: 2018-08-23

## (undated) RX ORDER — BETAMETHASONE SODIUM PHOSPHATE AND BETAMETHASONE ACETATE 3; 3 MG/ML; MG/ML
INJECTION, SUSPENSION INTRA-ARTICULAR; INTRALESIONAL; INTRAMUSCULAR; SOFT TISSUE
Status: DISPENSED
Start: 2018-08-23

## (undated) RX ORDER — LIDOCAINE HYDROCHLORIDE 10 MG/ML
INJECTION, SOLUTION INFILTRATION; PERINEURAL
Status: DISPENSED
Start: 2018-10-23

## (undated) RX ORDER — TRIAMCINOLONE ACETONIDE 40 MG/ML
INJECTION, SUSPENSION INTRA-ARTICULAR; INTRAMUSCULAR
Status: DISPENSED
Start: 2018-10-23

## (undated) RX ORDER — LIDOCAINE HYDROCHLORIDE 10 MG/ML
INJECTION, SOLUTION INFILTRATION; PERINEURAL
Status: DISPENSED
Start: 2018-08-23

## (undated) RX ORDER — BUPIVACAINE HYDROCHLORIDE 5 MG/ML
INJECTION, SOLUTION EPIDURAL; INTRACAUDAL
Status: DISPENSED
Start: 2018-10-23

## (undated) RX ORDER — BETAMETHASONE SODIUM PHOSPHATE AND BETAMETHASONE ACETATE 3; 3 MG/ML; MG/ML
INJECTION, SUSPENSION INTRA-ARTICULAR; INTRALESIONAL; INTRAMUSCULAR; SOFT TISSUE
Status: DISPENSED
Start: 2018-10-23

## (undated) RX ORDER — LIDOCAINE HYDROCHLORIDE 10 MG/ML
INJECTION, SOLUTION EPIDURAL; INFILTRATION; INTRACAUDAL; PERINEURAL
Status: DISPENSED
Start: 2018-10-23